# Patient Record
Sex: FEMALE | Race: WHITE
[De-identification: names, ages, dates, MRNs, and addresses within clinical notes are randomized per-mention and may not be internally consistent; named-entity substitution may affect disease eponyms.]

---

## 2018-03-11 ENCOUNTER — HOSPITAL ENCOUNTER (EMERGENCY)
Dept: HOSPITAL 80 - FED | Age: 38
Discharge: HOME | End: 2018-03-11
Payer: COMMERCIAL

## 2018-03-11 VITALS
DIASTOLIC BLOOD PRESSURE: 68 MMHG | TEMPERATURE: 98.8 F | SYSTOLIC BLOOD PRESSURE: 113 MMHG | HEART RATE: 79 BPM | OXYGEN SATURATION: 96 % | RESPIRATION RATE: 16 BRPM

## 2018-03-11 DIAGNOSIS — M54.42: Primary | ICD-10-CM

## 2018-03-11 NOTE — EDPHY
H & P


Time Seen by Provider: 03/11/18 13:04


HPI/ROS: 





Chief complaint.  Back injury





HPI.  37-year-old female presents emergency department with low back pain.  She 

has a history of scoliosis and occasional back pain.  3 weeks ago she went on a 

run and then followed by [facility] and has had progressively worsening low 

back pain since that time.  Pain started mid back and now is more in the low 

back.  She does have some radiation to the back of her left knee and occasional 

tingling to her left foot that comes and goes.  No leg weakness.  No bowel or 

bladder symptoms.  She is visiting from [place] the.





ROS


Constitutional.  no fever/chills, no weakness


Eyes.  no problems with vision


ENT.  no sore throat, no nasal drainage


Cardiovascular.  no chest pain


Respiratory.  no shortness of breath, no cough


Abdominal.  no abdominal pain, no nausea/vomiting, no diarrhea


.  no problems urinating


MS.  Low back pain with radiation down the left leg


Skin.  no rash


Lymph.  no swollen glands


Neuro.  no headache, no dizziness, no difficulty walking or with speech


Past Medical/Surgical History: 





Scoliosis


Social History: 





Single, nonsmoker, no alcohol


Smoking Status: Never smoked


Physical Exam: 





General Appearance:  Alert pleasant well-developed female mild distress vital 

signs are stable


Eyes: Pupils equal and round no pallor or injection.


ENT, Mouth:  Mucous membranes are moist.


Respiratory:  There are no retractions, lungs are clear to auscultation.


Cardiovascular: Regular rate and rhythm.


Gastrointestinal:   Abdomen is soft and nontender, no masses, bowel sounds 

normal.


Neurological:  Awake and alert, sensory and motor exams grossly normal.  

Straight leg raising with pain at 30 degrees on the left but not on the right.  

Deep tendon reflexes are symmetrical.  Great toe strength is normal.  Sensation 

is normal without saddle anesthesia


Skin: Warm and dry, no rashes.


Musculoskeletal:  Neck is supple nontender.


Extremities  symmetrical, full range of motion.


Psychiatric: Patient is oriented X 3, there is no agitation.


Constitutional: 


 Initial Vital Signs











Temperature (C)  36.9 C   03/11/18 11:54


 


Heart Rate  88   03/11/18 11:54


 


Respiratory Rate  18   03/11/18 11:54


 


Blood Pressure  114/86 H  03/11/18 11:54


 


O2 Sat (%)  98   03/11/18 11:54








 











O2 Delivery Mode               Room Air














Allergies/Adverse Reactions: 


 





No Known Allergies Allergy (Unverified 03/11/18 11:53)


 








Home Medications: 














 Medication  Instructions  Recorded


 


Cyclobenzaprine [Flexeril 10 MG 10 mg PO TID PRN #15 tab 03/11/18





(*)]  


 


Ibuprofen  03/11/18


 


oxyCODONE/APAP 5/325 [Percocet 1 tab PO Q4-6PRN PRN #14 tab 03/11/18





5/325]  














Medical Decision Making


ED Course/Re-evaluation: 





The patient is offered MRI but she declines.





Patient remains stable.  She and I discussed treatment plan including criteria 

for return importance of follow-up and further evaluation.  She expresses 

understanding and agreement


Differential Diagnosis: 





Low back pain with sciatica.  No evidence currently of cauda equina syndrome.  

This certainly could be HNP with sciatica.





Departure





- Departure


Disposition: Home, Routine, Self-Care


Clinical Impression: 


Low back pain


Qualifiers:


 Chronicity: acute Back pain laterality: left Sciatica presence: with sciatica 

Sciatica laterality: sciatica of left side Qualified Code(s): M54.42 - Lumbago 

with sciatica, left side





Condition: Good


Instructions:  Low Back Strain (ED), Lower Back Exercises (ED)


Additional Instructions: 


Activity as tolerated.  Ibuprofen 600 mg every 6 hr.  The Percocet as needed 

for pain in addition.  Flexeril as muscle relaxer.  Return for leg weakness, 

bowel or bladder symptoms.  Re-evaluation when you return home to [place] and 

next step would be MRI imaging


Referrals: 


CHELY GUNDERSON [Other] - As per Instructions


Prescriptions: 


Cyclobenzaprine [Flexeril 10 MG (*)] 10 mg PO TID PRN #15 tab


 PRN Reason: Spasms


oxyCODONE/APAP 5/325 [Percocet 5/325] 1 tab PO Q4-6PRN PRN #14 tab


 PRN Reason: Pain, Moderate